# Patient Record
Sex: MALE | Race: WHITE | NOT HISPANIC OR LATINO | Employment: FULL TIME | URBAN - METROPOLITAN AREA
[De-identification: names, ages, dates, MRNs, and addresses within clinical notes are randomized per-mention and may not be internally consistent; named-entity substitution may affect disease eponyms.]

---

## 2023-06-22 ENCOUNTER — HOSPITAL ENCOUNTER (EMERGENCY)
Facility: CLINIC | Age: 18
Discharge: HOME | End: 2023-06-22
Attending: EMERGENCY MEDICINE | Admitting: EMERGENCY MEDICINE
Payer: COMMERCIAL

## 2023-06-22 VITALS
TEMPERATURE: 99.6 F | DIASTOLIC BLOOD PRESSURE: 70 MMHG | OXYGEN SATURATION: 99 % | SYSTOLIC BLOOD PRESSURE: 134 MMHG | HEART RATE: 69 BPM | HEIGHT: 74 IN | WEIGHT: 170 LBS | BODY MASS INDEX: 21.82 KG/M2 | RESPIRATION RATE: 18 BRPM

## 2023-06-22 DIAGNOSIS — J02.9 SORE THROAT: ICD-10-CM

## 2023-06-22 LAB — GROUP A STREP BY PCR: NOT DETECTED

## 2023-06-22 PROCEDURE — 99283 EMERGENCY DEPT VISIT LOW MDM: CPT

## 2023-06-22 PROCEDURE — 250N000013 HC RX MED GY IP 250 OP 250 PS 637: Performed by: EMERGENCY MEDICINE

## 2023-06-22 PROCEDURE — 87651 STREP A DNA AMP PROBE: CPT | Performed by: EMERGENCY MEDICINE

## 2023-06-22 RX ORDER — IBUPROFEN 600 MG/1
600 TABLET, FILM COATED ORAL ONCE
Status: COMPLETED | OUTPATIENT
Start: 2023-06-22 | End: 2023-06-22

## 2023-06-22 RX ORDER — ACETAMINOPHEN 500 MG
1000 TABLET ORAL ONCE
Status: COMPLETED | OUTPATIENT
Start: 2023-06-22 | End: 2023-06-22

## 2023-06-22 RX ADMIN — ACETAMINOPHEN 1000 MG: 500 TABLET ORAL at 05:02

## 2023-06-22 RX ADMIN — IBUPROFEN 600 MG: 600 TABLET ORAL at 05:02

## 2023-06-22 ASSESSMENT — ACTIVITIES OF DAILY LIVING (ADL): ADLS_ACUITY_SCORE: 33

## 2023-06-22 NOTE — ED TRIAGE NOTES
Patient presents with throat pain and tonsillar swelling that woke him from sleep.  Denies fevers.     Triage Assessment       Row Name 06/22/23 0425       Triage Assessment (Adult)    Airway WDL WDL       Respiratory WDL    Respiratory WDL WDL       Skin Circulation/Temperature WDL    Skin Circulation/Temperature WDL WDL       Cardiac WDL    Cardiac WDL WDL       Peripheral/Neurovascular WDL    Peripheral Neurovascular WDL WDL       Cognitive/Neuro/Behavioral WDL    Cognitive/Neuro/Behavioral WDL WDL

## 2023-06-22 NOTE — ED PROVIDER NOTES
"  History     Chief Complaint:  Pharyngitis       HPI   Hugh Betancur is a 18 year old male who presents to the ED with pharyngitis that he woke up with around 0300 and noticed a big lump and several little bumps in his throat, making him concerned that it was freon poisoning after being exposed at work during the prior day for approximately one minute. The patient admits breathing causes pain in the sternal region of his chest at times and he took ibuprofen at 2030 yesterday for a migraine. The patient denies cough or congestion.    Independent Historian:   None - Patient Only        Medications:    The patient is currently on no regular medications.    Past Medical History:    No pertinent past medical history.    Physical Exam     Patient Vitals for the past 24 hrs:   BP Temp Pulse Resp SpO2 Height Weight   06/22/23 0424 134/70 99.6  F (37.6  C) 69 18 99 % 1.88 m (6' 2\") 77.1 kg (170 lb)        Physical Exam  General: Appears well-developed and well-nourished.   Head: No signs of trauma.   Mouth/Throat: Oropharynx with mild left sided erythema.  No exudate.  Uvula midline  Eyes: Conjunctivae are normal.   Neck: Normal range of motion. No nuchal rigidity. No cervical adenopathy  CV: Normal rate and regular rhythm.    Resp: Effort normal and breath sounds normal. No respiratory distress.   MSK: Normal range of motion.   Neuro: The patient is alert and oriented. Speech normal.  Skin: Skin is warm and dry. No rash noted.   Psych: normal mood and affect. behavior is normal.       Emergency Department Course      Laboratory:  Labs Ordered and Resulted from Time of ED Arrival to Time of ED Departure   GROUP A STREPTOCOCCUS PCR THROAT SWAB - Normal       Result Value    Group A strep by PCR Not Detected          Procedures   None    Emergency Department Course & Assessments:       Interventions:  Medications   ibuprofen (ADVIL/MOTRIN) tablet 600 mg (600 mg Oral $Given 6/22/23 0502)   acetaminophen (TYLENOL) tablet 1,000 " mg (1,000 mg Oral $Given 6/22/23 0502)        Assessments:  0442 I obtained history and examined the patient as noted above.  0506 I rechecked and deemed the patient safe to discharge home.    Independent Interpretation (X-rays, CTs, rhythm strip):  None    Consultations/Discussion of Management or Tests:  0446 I consulted poison control. They decided that freon poisoning was not the likely etiology of his sore throat.        Social Determinants of Health affecting care:   None    Disposition:  The patient was discharged to home.     Impression & Plan    CMS Diagnoses: None    Medical Decision Making:  Hugh Betancur presents with a sore throat with some redness.  Symptoms started tonight.  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  On my evaluation there was some mild erythema to the left tonsillar region.  There is no clinical signs for peritonsillar abscess.  Strep swab was obtained that was negative.  I did speak with poison control regarding the patient's concerns over Freon.  Poison Control states that for an exposure would not cause this finding and they also do not have any concerns regarding exposure at this time from exposure this afternoon as he should have fully cleared any toxicity from it at that point.  Patient was given supportive care instructions for pharyngitis which is likely viral in nature.    Diagnosis:    ICD-10-CM    1. Sore throat  J02.9            Discharge Medications:  There are no discharge medications for this patient.     Scribe Disclosure:  Zo WILEY, am serving as a scribe  at 6:13 AM on 6/22/2023 to document services personally performed by James Gupta MD based on my observations and the provider's statements to me.    Scribe Disclosure:  Vladimir WILEY, am serving as a scribe at 4:45 AM on 6/22/2023 to document services  personally performed by James Gupta MD based on my observations and the provider's statements to me.       James Gupta MD  06/22/23 0759